# Patient Record
Sex: FEMALE | Race: WHITE | NOT HISPANIC OR LATINO | Employment: FULL TIME | URBAN - METROPOLITAN AREA
[De-identification: names, ages, dates, MRNs, and addresses within clinical notes are randomized per-mention and may not be internally consistent; named-entity substitution may affect disease eponyms.]

---

## 2019-03-06 ENCOUNTER — APPOINTMENT (OUTPATIENT)
Dept: WOMENS IMAGING | Facility: HOSPITAL | Age: 44
End: 2019-03-06

## 2019-03-06 PROCEDURE — 77067 SCR MAMMO BI INCL CAD: CPT | Performed by: RADIOLOGY

## 2019-05-13 ENCOUNTER — HOSPITAL ENCOUNTER (EMERGENCY)
Facility: HOSPITAL | Age: 44
Discharge: HOME | End: 2019-05-13
Attending: EMERGENCY MEDICINE | Admitting: EMERGENCY MEDICINE

## 2019-05-13 ENCOUNTER — APPOINTMENT (OUTPATIENT)
Dept: GENERAL RADIOLOGY | Facility: HOSPITAL | Age: 44
End: 2019-05-13

## 2019-05-13 VITALS
HEIGHT: 62 IN | HEART RATE: 76 BPM | DIASTOLIC BLOOD PRESSURE: 89 MMHG | SYSTOLIC BLOOD PRESSURE: 125 MMHG | TEMPERATURE: 98 F | WEIGHT: 178 LBS | OXYGEN SATURATION: 98 % | BODY MASS INDEX: 32.76 KG/M2 | RESPIRATION RATE: 16 BRPM

## 2019-05-13 DIAGNOSIS — R94.31 PROLONGED QT INTERVAL: ICD-10-CM

## 2019-05-13 DIAGNOSIS — R07.89 ATYPICAL CHEST PAIN: Primary | ICD-10-CM

## 2019-05-13 LAB
ALBUMIN SERPL-MCNC: 3.5 G/DL (ref 3.5–5.2)
ALBUMIN/GLOB SERPL: 1 G/DL
ALP SERPL-CCNC: 99 U/L (ref 39–117)
ALT SERPL W P-5'-P-CCNC: 19 U/L (ref 1–33)
ANION GAP SERPL CALCULATED.3IONS-SCNC: 11 MMOL/L
AST SERPL-CCNC: 13 U/L (ref 1–32)
BASOPHILS # BLD AUTO: 0.02 10*3/MM3 (ref 0–0.2)
BASOPHILS NFR BLD AUTO: 0.2 % (ref 0–1.5)
BILIRUB SERPL-MCNC: <0.2 MG/DL (ref 0.2–1.2)
BUN BLD-MCNC: 9 MG/DL (ref 6–20)
BUN/CREAT SERPL: 11 (ref 7–25)
CALCIUM SPEC-SCNC: 8.6 MG/DL (ref 8.6–10.5)
CHLORIDE SERPL-SCNC: 107 MMOL/L (ref 98–107)
CO2 SERPL-SCNC: 23 MMOL/L (ref 22–29)
CREAT BLD-MCNC: 0.82 MG/DL (ref 0.57–1)
DEPRECATED RDW RBC AUTO: 44.4 FL (ref 37–54)
EOSINOPHIL # BLD AUTO: 0.07 10*3/MM3 (ref 0–0.4)
EOSINOPHIL NFR BLD AUTO: 0.8 % (ref 0.3–6.2)
ERYTHROCYTE [DISTWIDTH] IN BLOOD BY AUTOMATED COUNT: 14.3 % (ref 12.3–15.4)
GFR SERPL CREATININE-BSD FRML MDRD: 76 ML/MIN/1.73
GLOBULIN UR ELPH-MCNC: 3.6 GM/DL
GLUCOSE BLD-MCNC: 118 MG/DL (ref 65–99)
HCT VFR BLD AUTO: 39.9 % (ref 34–46.6)
HGB BLD-MCNC: 12.9 G/DL (ref 12–15.9)
HOLD SPECIMEN: NORMAL
HOLD SPECIMEN: NORMAL
IMM GRANULOCYTES # BLD AUTO: 0.01 10*3/MM3 (ref 0–0.05)
IMM GRANULOCYTES NFR BLD AUTO: 0.1 % (ref 0–0.5)
LIPASE SERPL-CCNC: 21 U/L (ref 13–60)
LYMPHOCYTES # BLD AUTO: 1.88 10*3/MM3 (ref 0.7–3.1)
LYMPHOCYTES NFR BLD AUTO: 21.8 % (ref 19.6–45.3)
MCH RBC QN AUTO: 27.6 PG (ref 26.6–33)
MCHC RBC AUTO-ENTMCNC: 32.3 G/DL (ref 31.5–35.7)
MCV RBC AUTO: 85.3 FL (ref 79–97)
MONOCYTES # BLD AUTO: 0.55 10*3/MM3 (ref 0.1–0.9)
MONOCYTES NFR BLD AUTO: 6.4 % (ref 5–12)
NEUTROPHILS # BLD AUTO: 6.11 10*3/MM3 (ref 1.7–7)
NEUTROPHILS NFR BLD AUTO: 70.8 % (ref 42.7–76)
NT-PROBNP SERPL-MCNC: 88.8 PG/ML (ref 5–450)
PLATELET # BLD AUTO: 332 10*3/MM3 (ref 140–450)
PMV BLD AUTO: 10.1 FL (ref 6–12)
POTASSIUM BLD-SCNC: 4.1 MMOL/L (ref 3.5–5.2)
PROT SERPL-MCNC: 7.1 G/DL (ref 6–8.5)
RBC # BLD AUTO: 4.68 10*6/MM3 (ref 3.77–5.28)
SODIUM BLD-SCNC: 141 MMOL/L (ref 136–145)
TROPONIN T SERPL-MCNC: <0.01 NG/ML (ref 0–0.03)
TROPONIN T SERPL-MCNC: <0.01 NG/ML (ref 0–0.03)
WBC NRBC COR # BLD: 8.63 10*3/MM3 (ref 3.4–10.8)
WHOLE BLOOD HOLD SPECIMEN: NORMAL
WHOLE BLOOD HOLD SPECIMEN: NORMAL

## 2019-05-13 PROCEDURE — 93005 ELECTROCARDIOGRAM TRACING: CPT | Performed by: EMERGENCY MEDICINE

## 2019-05-13 PROCEDURE — 85025 COMPLETE CBC W/AUTO DIFF WBC: CPT | Performed by: EMERGENCY MEDICINE

## 2019-05-13 PROCEDURE — 83880 ASSAY OF NATRIURETIC PEPTIDE: CPT | Performed by: EMERGENCY MEDICINE

## 2019-05-13 PROCEDURE — 83690 ASSAY OF LIPASE: CPT | Performed by: EMERGENCY MEDICINE

## 2019-05-13 PROCEDURE — 99284 EMERGENCY DEPT VISIT MOD MDM: CPT

## 2019-05-13 PROCEDURE — 84484 ASSAY OF TROPONIN QUANT: CPT | Performed by: EMERGENCY MEDICINE

## 2019-05-13 PROCEDURE — 71045 X-RAY EXAM CHEST 1 VIEW: CPT

## 2019-05-13 PROCEDURE — 80053 COMPREHEN METABOLIC PANEL: CPT | Performed by: EMERGENCY MEDICINE

## 2019-05-13 RX ORDER — SODIUM CHLORIDE 0.9 % (FLUSH) 0.9 %
10 SYRINGE (ML) INJECTION AS NEEDED
Status: DISCONTINUED | OUTPATIENT
Start: 2019-05-13 | End: 2019-05-13

## 2019-05-13 RX ORDER — ASPIRIN 81 MG/1
324 TABLET, CHEWABLE ORAL ONCE
Status: DISCONTINUED | OUTPATIENT
Start: 2019-05-13 | End: 2019-05-13

## 2019-05-13 RX ORDER — NAPROXEN SODIUM 220 MG
220 TABLET ORAL 2 TIMES DAILY PRN
COMMUNITY

## 2019-05-13 RX ORDER — SODIUM CHLORIDE 0.9 % (FLUSH) 0.9 %
10 SYRINGE (ML) INJECTION AS NEEDED
Status: DISCONTINUED | OUTPATIENT
Start: 2019-05-13 | End: 2019-05-13 | Stop reason: HOSPADM

## 2019-05-13 RX ORDER — LISINOPRIL 10 MG/1
10 TABLET ORAL DAILY
COMMUNITY

## 2019-05-13 RX ORDER — OMEPRAZOLE 20 MG/1
20 CAPSULE, DELAYED RELEASE ORAL 2 TIMES DAILY
COMMUNITY
End: 2019-05-16

## 2019-05-13 NOTE — ED PROVIDER NOTES
"Subjective   This patient is a 43-year-old  female with a history of chest pain that started yesterday.  She tells me she had a tightness in the middle of her chest as well as bilateral arm and hand \"tingling.\"  This discomfort lasted most of yesterday and through the night and so she went to [facility] in [facility] yesterday.  She tells me her blood pressure was high there and her troponin was abnormal.  She was ultimately admitted for chest pain.  She tells me she was told that multiple studies were going to be performed today due to her presentation, abnormal labs, and findings.  The patient then tells me somewhat complex story related to her frustration around nursing care at this outside hospital.  She tells me she had a headache that was not properly treated and that her pain was not adequately controlled.  She decided to check herself out AGAINST MEDICAL ADVICE from [facility] and had her son bring her up here for second opinion and another shot and evaluate her chest pain.  She has not been here before.  These are new complaints for her.  She denies hemoptysis or hematemesis.  Denies any lower extremity swelling or pain.  She does give me a history of significant family cardiac disease.  She denies any current shortness of breath.  Tells me she does not currently have chest pain.  Her blood pressure is 138/84 when I evaluate her in the room.  In summary, we have a 43-year-old female here with chest pain who was recently evaluated and admitted to a local hospital but did not complete care and he signed out AMA, presenting here this morning for evaluation of the same.    Past medical history  Hypertension, on medication.  Hypercholesterolemia, not medicated at this time    Family history  Significant for CAD            Review of Systems   Constitutional: Negative.  Negative for chills, fatigue, fever and unexpected weight change.   HENT: Negative for dental problem, ear pain, " "hearing loss and sinus pressure.    Eyes: Negative for pain and visual disturbance.   Respiratory: Negative for chest tightness and shortness of breath.    Cardiovascular: Positive for chest pain. Negative for palpitations and leg swelling.        Tightness.   Gastrointestinal: Negative for blood in stool, diarrhea, nausea and vomiting.   Genitourinary: Negative for difficulty urinating, dysuria, frequency, hematuria and urgency.   Musculoskeletal: Negative for myalgias, neck pain and neck stiffness.   Neurological: Negative for seizures, syncope, speech difficulty, light-headedness and headaches.        Numbness and tingling in the bilateral hands   Psychiatric/Behavioral: Negative for confusion.   All other systems reviewed and are negative.      Past Medical History:   Diagnosis Date   • Dercum's disease    • Fibromyalgia    • GERD (gastroesophageal reflux disease)    • Hyperlipidemia    • Hypertension        Allergies   Allergen Reactions   • Morphine Other (See Comments)     Pt states her blood pressure went \"through the roof, stroke level\"   • Sulfa Antibiotics GI Intolerance     \"makes my stomach hurt\"       Past Surgical History:   Procedure Laterality Date   • CHOLECYSTECTOMY     • DENTAL PROCEDURE     • DILATATION AND CURETTAGE     • HYSTERECTOMY     • OOPHORECTOMY Right    • OTHER SURGICAL HISTORY      has had 19 dercum angiomas removed       History reviewed. No pertinent family history.    Social History     Socioeconomic History   • Marital status:      Spouse name: Not on file   • Number of children: Not on file   • Years of education: Not on file   • Highest education level: Not on file   Tobacco Use   • Smoking status: Current Every Day Smoker     Packs/day: 1.00     Types: Cigarettes   • Smokeless tobacco: Never Used   Substance and Sexual Activity   • Alcohol use: No     Frequency: Never   • Drug use: No   • Sexual activity: Defer           Objective   Physical Exam   Constitutional: She " is oriented to person, place, and time. She appears well-developed.  Non-toxic appearance. No distress.   HENT:   Head: Normocephalic and atraumatic.   Right Ear: Tympanic membrane, external ear and ear canal normal.   Left Ear: Tympanic membrane, external ear and ear canal normal.   Nose: Nose normal. No nasal septal hematoma.   Mouth/Throat: Oropharynx is clear and moist. Mucous membranes are not dry. No oral lesions. No trismus in the jaw. No dental abscesses or uvula swelling. No posterior oropharyngeal erythema or tonsillar abscesses. No tonsillar exudate.   Eyes: EOM are normal. Pupils are equal, round, and reactive to light. Right conjunctiva is not injected. Left conjunctiva is not injected.   Neck: Normal range of motion. Neck supple. No JVD present. No tracheal tenderness present. No neck rigidity. Normal range of motion present.   Cardiovascular: Normal rate, regular rhythm and normal heart sounds. Exam reveals no gallop and no friction rub.   Pulmonary/Chest: Effort normal and breath sounds normal. She has no wheezes. She has no rales. She exhibits no tenderness.   Abdominal: Soft. Bowel sounds are normal. She exhibits no distension, no pulsatile midline mass and no mass. There is no tenderness. There is no rigidity, no rebound, no guarding and no tenderness at McBurney's point.   No signs of acute abdomen.  No pain at McBurney's point.  No pulsatile abdominal mass.   Musculoskeletal: Normal range of motion. She exhibits no edema, tenderness or deformity.   Lymphadenopathy:     She has no cervical adenopathy.   Neurological: She is alert and oriented to person, place, and time. She has normal strength. She displays no tremor. No cranial nerve deficit or sensory deficit. She exhibits normal muscle tone.   5/5 strength bilaterally with flexion and extension of fingers, wrist, elbows, knees and hips as well as plantar and dorsiflexion of the foot.   Skin: Skin is warm and dry. No rash noted. No erythema.    No diaphoresis, lesions, nevi, petechia, purpura   Psychiatric: She has a normal mood and affect. Her speech is normal and behavior is normal. Judgment and thought content normal. She is attentive.   Nursing note and vitals reviewed.      Procedures           ED Course  ED Course as of May 13 1607   Mon May 13, 2019   0655 Patient evidently signed out AGAINST MEDICAL ADVICE from a local hospital this morning.  Initial blood pressure here was 149/111.  Repeat is 138/84.  [KEYA]   0657 Pt. Received aspirin in [place], [place] at outside hospital.  324 mg.   [KEYA]   0705 I had a nice conversation with the patient.  I discussed the work-up, the differential diagnosis, and the medical decision making.  Initial EKG shows somewhat prolonged QT segment of 492.  Patient is currently chest pain-free.  Patient is without question or complaint.  I described the likely plan here and she is agreeable to it.  No questions or complaints at this time.  We will keep a close eye on the patient.  If lab and diagnostic work-up is unremarkable, as anticipated, I do anticipate patient will follow-up with cardiology as an outpatient.  Patient is agreeable to the plan and without question or complaint.  [KEYA]   0846 CBC, CMP, troponin, lipase, BNP, chest x-ray and EKG all essentially unremarkable with exception of the QTC previously noted, and discussed with the patient.  Second set is pending.  If unremarkable, as anticipated, I do to be discharged home to follow-up with cardiology as an outpatient.  [KEYA]   0904 Second EKG was just completed.  I reviewed independently.  A normal sinus rhythm with a rate of 63 otherwise no abnormalities was noted.  QTC on this EKG is 474 which is improved.  [KEYA]   0917 Dr. Escobedo reevaluated the patient at bedside and updated them on findings and discussed plan for further care.   [TJ]   0940 I have now reexamined the patient twice for a total of 3 evaluations.  First and second troponin unremarkable.  Second  EKG showed a normal sinus rhythm with no evidence of abnormality.  We will have the patient follow-up with the chest pain clinic.  I do believe outpatient and additional testing is appropriate.  Patient is very agreeable to this plan.  We will have her take aspirin as needed.  Impression will include atypical chest pain, prolonged QT on initial EKG.  Plan will include mandatory follow-up with our chest pain clinic.  They will call for an appointment.  The patient should call here at [phone] by 12:00 tomorrow if she has not heard from them around scheduling.  Aspirin daily.  Return immediately for new or changing concerns.  Patient agreeable to the plan and without question or complaint will be discharged home accordingly.  [KEYA]   3014 Dr. Escobedo reevaluated the patient at bedside and discussed all available findings and plan for further care.   [TJ]      ED Course User Index  [KEYA] Allan Escobedo MD  [TJ] Patrice Darby                  [facility]      Final diagnoses:   Atypical chest pain   Prolonged QT interval            Allan Escobedo MD  05/13/19 1658

## 2019-05-13 NOTE — DISCHARGE INSTRUCTIONS
Follow up with our Chest Pain clinic as I have arranged for you. They will call you to schedule an appointment. If you have not heard by them tomorrow by noon, please call them to arrange an appointment. Start taking Aspirin daily as we discussed at bedside at the time of your discharge. Follow up with your PCP, Elza Ventura, within one week.     Immediately return to the ED for worsening or new concerning symptoms.     Please review the medications you are supposed to be taking according to prior physician recommendations. I have not changed your home medications during this visit. If your discharge instructions indicate that I have changed your home medications, this is not the case, and you should disregard. If you have any questions about the medication you should be taking at home, please call your physician.

## 2019-05-15 NOTE — PROGRESS NOTES
"[facility]  [facility]      Encounter Date:05/16/2019     Larisa Cisneros  [address] [place] [id number]  [unfilled]    1975    Elza Ventura    Larisa Cisneros is a 43 y.o. female.      Subjective:     Chief Complaint:   ED referral for chest pain    HPI   Patient is a 43-year-old female with past medical history significant for hyperlipidemia, hypertension, GERD, fibromyalgia who presents to the chest pain clinic at the request of Dr. Escobedo.  Patient presented the ED on 5/13/2019 with tightness in the middle of her chest as well as bilateral arm and hand tingling.  Pain came on suddenly. Patient went to [facility] 5/12/19 and reports troponins were initially elevated and she was admitted for cardiac testing.  Patient checked herself out AGAINST MEDICAL ADVICE due to frustration with medical care.  She presented to [facility] for a second opinion.  Work-up in ED was unremarkable except for slightly prolonged QT C  on initial EKG, which improved to on repeat EKG. She notes several months of worsening fatigue and exertional dyspnea. She also notes increasing palpitations over past several months described as a \"flip flop\"    Cardiac risk factors:  dyslipidemia, hypertension, tobacco use, sedentary lifestyle, obesity (BMI > 30),  Family history of premature coronary artery disease (male < 55 yrs, female <66 yrs)      Past Medical History:   Diagnosis Date   • Dercum's disease    • Fibromyalgia    • GERD (gastroesophageal reflux disease)    • Hyperlipidemia    • Hypertension        Past Surgical History:   Procedure Laterality Date   • CHOLECYSTECTOMY     • DENTAL PROCEDURE     • DILATATION AND CURETTAGE     • HYSTERECTOMY     • OOPHORECTOMY Right    • OTHER SURGICAL HISTORY      has had 19 dercum angiomas removed       Family History   Problem Relation Age of Onset   • Hypothyroidism Mother    • Hypertension Mother    • Diabetes Mother  " "  • Heart attack Father    • Asthma Father    • Diabetes Sister    • Kidney disease Sister    • Heart failure Sister    • Polycystic kidney disease Sister    • Hypertension Sister    • Other Sister         angiomas   • Other Maternal Grandmother         factor 5    • Heart attack Maternal Grandfather    • Cancer Paternal Grandmother    • Other Paternal Grandmother         ra   • Heart attack Paternal Grandfather        Social History     Socioeconomic History   • Marital status:      Spouse name: Not on file   • Number of children: Not on file   • Years of education: Not on file   • Highest education level: Not on file   Tobacco Use   • Smoking status: Current Every Day Smoker     Packs/day: 1.00     Types: Cigarettes   • Smokeless tobacco: Never Used   Substance and Sexual Activity   • Alcohol use: No     Frequency: Never   • Drug use: No   • Sexual activity: Defer   Social History Narrative    Caffeine: 1 serving daily and trying to quit        Allergies   Allergen Reactions   • Morphine Other (See Comments)     Pt states her blood pressure went \"through the roof, stroke level\"   • Sulfa Antibiotics GI Intolerance     \"makes my stomach hurt\"         Current Outpatient Medications:   •  lisinopril (PRINIVIL,ZESTRIL) 10 MG tablet, Take 10 mg by mouth Daily., Disp: , Rfl:   •  naproxen sodium (ALEVE) 220 MG tablet, Take 220 mg by mouth 2 (Two) Times a Day As Needed., Disp: , Rfl:   •  raNITIdine (ZANTAC) 150 MG tablet, Take 150 mg by mouth Daily., Disp: , Rfl: 1    The following portions of the patient's history were reviewed today and updated as appropriate: allergies, current medications, past family history, past medical history, past social history, past surgical history and problem list     Review of Systems   Constitution: Positive for weakness, malaise/fatigue and weight gain. Negative for chills and fever.   HENT: Positive for congestion and nosebleeds.    Eyes: Negative.    Cardiovascular: Positive " "for chest pain, dyspnea on exertion and leg swelling. Negative for claudication, cyanosis, irregular heartbeat, near-syncope, orthopnea, palpitations, paroxysmal nocturnal dyspnea and syncope.   Respiratory: Positive for cough, shortness of breath, snoring and wheezing.    Endocrine: Positive for cold intolerance, heat intolerance and polydipsia.   Hematologic/Lymphatic: Does not bruise/bleed easily.   Skin: Negative for poor wound healing.   Musculoskeletal: Negative.    Gastrointestinal: Positive for bloating, abdominal pain, constipation, diarrhea, dysphagia, heartburn and nausea. Negative for hematemesis, melena and vomiting.   Genitourinary: Negative.  Negative for hematuria.   Neurological: Positive for dizziness, light-headedness and loss of balance.   Psychiatric/Behavioral: Positive for memory loss. The patient has insomnia and is nervous/anxious.    Allergic/Immunologic: Positive for environmental allergies.       Objective:     Vitals:    05/16/19 1017 05/16/19 1018 05/16/19 1019   BP: 158/92 151/96 146/91   BP Location: Right arm Left arm Left arm   Patient Position: Sitting Sitting Standing   Cuff Size: Adult Adult Adult   Pulse: 81 88 90   Resp: 18     Temp: 97.1 °F (36.2 °C)     TempSrc: Temporal     SpO2: 97% 97% 98%   Weight: 82.7 kg (182 lb 6 oz)     Height: 157.5 cm (62\")         Physical Exam   Constitutional: She is oriented to person, place, and time. She appears well-developed and well-nourished. No distress.   HENT:   Head: Normocephalic.   Eyes: Conjunctivae are normal. Pupils are equal, round, and reactive to light.   Neck: Neck supple. No JVD present. No thyromegaly present.   Cardiovascular: Normal rate, regular rhythm, normal heart sounds and intact distal pulses. Exam reveals no gallop and no friction rub.   No murmur heard.  Pulmonary/Chest: Effort normal. No respiratory distress. She has no wheezes. She has rhonchi in the left lower field. She has no rales. She exhibits no tenderness. "   Abdominal: Soft. Bowel sounds are normal.   Musculoskeletal: Normal range of motion. She exhibits no edema.   Neurological: She is alert and oriented to person, place, and time.   Skin: Skin is warm and dry.   Psychiatric: She has a normal mood and affect. Her behavior is normal. Thought content normal.   Vitals reviewed.      Lab and Diagnostic Review:  Results for orders placed or performed during the hospital encounter of 05/13/19   Troponin   Result Value Ref Range    Troponin T <0.010 0.000 - 0.030 ng/mL   Comprehensive Metabolic Panel   Result Value Ref Range    Glucose 118 (H) 65 - 99 mg/dL    BUN 9 6 - 20 mg/dL    Creatinine 0.82 0.57 - 1.00 mg/dL    Sodium 141 136 - 145 mmol/L    Potassium 4.1 3.5 - 5.2 mmol/L    Chloride 107 98 - 107 mmol/L    CO2 23.0 22.0 - 29.0 mmol/L    Calcium 8.6 8.6 - 10.5 mg/dL    Total Protein 7.1 6.0 - 8.5 g/dL    Albumin 3.50 3.50 - 5.20 g/dL    ALT (SGPT) 19 1 - 33 U/L    AST (SGOT) 13 1 - 32 U/L    Alkaline Phosphatase 99 39 - 117 U/L    Total Bilirubin <0.2 (L) 0.2 - 1.2 mg/dL    eGFR Non African Amer 76 >60 mL/min/1.73    Globulin 3.6 gm/dL    A/G Ratio 1.0 g/dL    BUN/Creatinine Ratio 11.0 7.0 - 25.0    Anion Gap 11.0 mmol/L   Lipase   Result Value Ref Range    Lipase 21 13 - 60 U/L   BNP   Result Value Ref Range    proBNP 88.8 5.0 - 450.0 pg/mL   CBC Auto Differential   Result Value Ref Range    WBC 8.63 3.40 - 10.80 10*3/mm3    RBC 4.68 3.77 - 5.28 10*6/mm3    Hemoglobin 12.9 12.0 - 15.9 g/dL    Hematocrit 39.9 34.0 - 46.6 %    MCV 85.3 79.0 - 97.0 fL    MCH 27.6 26.6 - 33.0 pg    MCHC 32.3 31.5 - 35.7 g/dL    RDW 14.3 12.3 - 15.4 %    RDW-SD 44.4 37.0 - 54.0 fl    MPV 10.1 6.0 - 12.0 fL    Platelets 332 140 - 450 10*3/mm3    Neutrophil % 70.8 42.7 - 76.0 %    Lymphocyte % 21.8 19.6 - 45.3 %    Monocyte % 6.4 5.0 - 12.0 %    Eosinophil % 0.8 0.3 - 6.2 %    Basophil % 0.2 0.0 - 1.5 %    Immature Grans % 0.1 0.0 - 0.5 %    Neutrophils, Absolute 6.11 1.70 - 7.00 10*3/mm3     Lymphocytes, Absolute 1.88 0.70 - 3.10 10*3/mm3    Monocytes, Absolute 0.55 0.10 - 0.90 10*3/mm3    Eosinophils, Absolute 0.07 0.00 - 0.40 10*3/mm3    Basophils, Absolute 0.02 0.00 - 0.20 10*3/mm3    Immature Grans, Absolute 0.01 0.00 - 0.05 10*3/mm3   Troponin   Result Value Ref Range    Troponin T <0.010 0.000 - 0.030 ng/mL     EKG in ED showed NSR    Assessment and Plan:   1. Other chest pain  Get records from [facility]  Multiple ASCVD risks  - Adult Stress Echo W/ Cont or Stress Agent if Necessary Per Protocol; Future    2. Shortness of breath  - Adult Stress Echo W/ Cont or Stress Agent if Necessary Per Protocol; Future    3. Palpitations  - Adult Stress Echo W/ Cont or Stress Agent if Necessary Per Protocol; Future  - Holter Monitor x 14 days    4. Essential hypertension  Elevated today but did not take meds. Patient states she is picking up a blood pressure cuff today and will monitor. Goal <130/80  - Adult Stress Echo W/ Cont or Stress Agent if Necessary Per Protocol; Future    5. Hyperlipidemia, unspecified hyperlipidemia type  Reports that it was found on recent labs. She would like to try diet for a few months and repeat  - Adult Stress Echo W/ Cont or Stress Agent if Necessary Per Protocol; Future    6. Tobacco abuse  Cessation encouraged. Discussed risks    RV in 4 weeks      It has been a pleasure to participate in the care of this patient.  Patient was instructed to call the [facility] with any questions, concerns, or worsening symptoms.    *Please note that portions of this note were completed with a voice recognition program. Efforts were made to edit the dictations, but occasionally words are mistranscribed.

## 2019-05-16 ENCOUNTER — TELEPHONE (OUTPATIENT)
Dept: CARDIOLOGY | Facility: HOSPITAL | Age: 44
End: 2019-05-16

## 2019-05-16 ENCOUNTER — HOSPITAL ENCOUNTER (OUTPATIENT)
Dept: CARDIOLOGY | Facility: HOSPITAL | Age: 44
Discharge: HOME | End: 2019-05-16
Admitting: NURSE PRACTITIONER

## 2019-05-16 ENCOUNTER — OFFICE VISIT (OUTPATIENT)
Dept: CARDIOLOGY | Facility: HOSPITAL | Age: 44
End: 2019-05-16

## 2019-05-16 VITALS
DIASTOLIC BLOOD PRESSURE: 91 MMHG | OXYGEN SATURATION: 98 % | BODY MASS INDEX: 33.56 KG/M2 | TEMPERATURE: 97.1 F | WEIGHT: 182.38 LBS | RESPIRATION RATE: 18 BRPM | HEIGHT: 62 IN | HEART RATE: 90 BPM | SYSTOLIC BLOOD PRESSURE: 146 MMHG

## 2019-05-16 DIAGNOSIS — R07.89 OTHER CHEST PAIN: Primary | ICD-10-CM

## 2019-05-16 DIAGNOSIS — I10 ESSENTIAL HYPERTENSION: ICD-10-CM

## 2019-05-16 DIAGNOSIS — E78.5 HYPERLIPIDEMIA, UNSPECIFIED HYPERLIPIDEMIA TYPE: ICD-10-CM

## 2019-05-16 DIAGNOSIS — R06.02 SHORTNESS OF BREATH: ICD-10-CM

## 2019-05-16 DIAGNOSIS — R00.2 PALPITATIONS: ICD-10-CM

## 2019-05-16 PROCEDURE — 0296T HC EXT ECG > 48HR TO 21 DAY RCRD W/CONECT INTL RCRD: CPT

## 2019-05-16 PROCEDURE — 99204 OFFICE O/P NEW MOD 45 MIN: CPT | Performed by: NURSE PRACTITIONER

## 2019-05-16 PROCEDURE — 0298T HOLTER MONITOR - 72 HOUR UP TO 21 DAY: CPT | Performed by: INTERNAL MEDICINE

## 2019-05-16 RX ORDER — RANITIDINE 150 MG/1
150 TABLET ORAL DAILY
Refills: 1 | COMMUNITY
Start: 2019-04-15

## 2019-05-16 RX ORDER — ATORVASTATIN CALCIUM 40 MG/1
40 TABLET, FILM COATED ORAL DAILY
Qty: 30 TABLET | Refills: 3 | Status: SHIPPED | OUTPATIENT
Start: 2019-05-16

## 2019-05-16 NOTE — TELEPHONE ENCOUNTER
Received records from [facility] which showed that patient had a troponin I of 0.085 on 5/12/2019 at 2250 and 0.092 on 5/13/2019 at 3:46 AM. She was started on heparin but left AMA because she did not get dilaudid for her headache in a timely fashion per records.  Called patient and recommended that she start aspirin, statin and beta-blocker.  Explained rationale for starting these medications and she agrees to proceed.  She will have stress test on Monday.

## 2019-05-20 ENCOUNTER — HOSPITAL ENCOUNTER (OUTPATIENT)
Dept: CARDIOLOGY | Facility: HOSPITAL | Age: 44
Discharge: HOME | End: 2019-05-20
Admitting: NURSE PRACTITIONER

## 2019-05-20 DIAGNOSIS — E78.5 HYPERLIPIDEMIA, UNSPECIFIED HYPERLIPIDEMIA TYPE: ICD-10-CM

## 2019-05-20 DIAGNOSIS — R06.02 SHORTNESS OF BREATH: ICD-10-CM

## 2019-05-20 DIAGNOSIS — R07.89 OTHER CHEST PAIN: ICD-10-CM

## 2019-05-20 DIAGNOSIS — R00.2 PALPITATIONS: ICD-10-CM

## 2019-05-20 DIAGNOSIS — I10 ESSENTIAL HYPERTENSION: ICD-10-CM

## 2019-05-20 LAB
BH CV ECHO MEAS - AO ROOT AREA (BSA CORRECTED): 1.6
BH CV ECHO MEAS - AO ROOT AREA: 6.8 CM^2
BH CV ECHO MEAS - AO ROOT DIAM: 2.9 CM
BH CV ECHO MEAS - BSA(HAYCOCK): 1.9 M^2
BH CV ECHO MEAS - BSA: 1.8 M^2
BH CV ECHO MEAS - BZI_BMI: 33.3 KILOGRAMS/M^2
BH CV ECHO MEAS - BZI_METRIC_HEIGHT: 157.5 CM
BH CV ECHO MEAS - BZI_METRIC_WEIGHT: 82.6 KG
BH CV ECHO MEAS - EDV(CUBED): 80 ML
BH CV ECHO MEAS - EDV(TEICH): 83.5 ML
BH CV ECHO MEAS - EF(CUBED): 82 %
BH CV ECHO MEAS - EF(TEICH): 75 %
BH CV ECHO MEAS - ESV(CUBED): 14.4 ML
BH CV ECHO MEAS - ESV(TEICH): 20.8 ML
BH CV ECHO MEAS - FS: 43.6 %
BH CV ECHO MEAS - IVS/LVPW: 1
BH CV ECHO MEAS - IVSD: 1.2 CM
BH CV ECHO MEAS - LA DIMENSION: 3.6 CM
BH CV ECHO MEAS - LA/AO: 1.2
BH CV ECHO MEAS - LV MASS(C)D: 176.7 GRAMS
BH CV ECHO MEAS - LV MASS(C)DI: 96.2 GRAMS/M^2
BH CV ECHO MEAS - LVIDD: 4.3 CM
BH CV ECHO MEAS - LVIDS: 2.4 CM
BH CV ECHO MEAS - LVPWD: 1.2 CM
BH CV ECHO MEAS - MV A MAX VEL: 68.6 CM/SEC
BH CV ECHO MEAS - MV DEC SLOPE: 399.6 CM/SEC^2
BH CV ECHO MEAS - MV DEC TIME: 0.25 SEC
BH CV ECHO MEAS - MV E MAX VEL: 58.2 CM/SEC
BH CV ECHO MEAS - MV E/A: 0.85
BH CV ECHO MEAS - MV P1/2T MAX VEL: 86.9 CM/SEC
BH CV ECHO MEAS - MV P1/2T: 63.7 MSEC
BH CV ECHO MEAS - MVA P1/2T LCG: 2.5 CM^2
BH CV ECHO MEAS - MVA(P1/2T): 3.5 CM^2
BH CV ECHO MEAS - PA ACC SLOPE: 293.7 CM/SEC^2
BH CV ECHO MEAS - PA ACC TIME: 0.15 SEC
BH CV ECHO MEAS - PA PR(ACCEL): 12.5 MMHG
BH CV ECHO MEAS - SI(CUBED): 35.8 ML/M^2
BH CV ECHO MEAS - SI(TEICH): 34.1 ML/M^2
BH CV ECHO MEAS - SV(CUBED): 65.7 ML
BH CV ECHO MEAS - SV(TEICH): 62.7 ML
BH CV STRESS BP STAGE 1: NORMAL
BH CV STRESS BP STAGE 2: NORMAL
BH CV STRESS BP STAGE 3: NORMAL
BH CV STRESS DURATION MIN STAGE 1: 3
BH CV STRESS DURATION MIN STAGE 2: 3
BH CV STRESS DURATION MIN STAGE 3: 2
BH CV STRESS DURATION SEC STAGE 1: 0
BH CV STRESS DURATION SEC STAGE 2: 0
BH CV STRESS DURATION SEC STAGE 3: 54
BH CV STRESS ECHO POST STRESS EJECTION FRACTION EF: 75 %
BH CV STRESS GRADE STAGE 1: 10
BH CV STRESS GRADE STAGE 2: 12
BH CV STRESS GRADE STAGE 3: 14
BH CV STRESS HR STAGE 1: 112
BH CV STRESS HR STAGE 2: 127
BH CV STRESS HR STAGE 3: 151
BH CV STRESS METS STAGE 1: 5
BH CV STRESS METS STAGE 2: 7.5
BH CV STRESS METS STAGE 3: 10
BH CV STRESS PROTOCOL 1: NORMAL
BH CV STRESS RECOVERY BP: NORMAL MMHG
BH CV STRESS RECOVERY HR: 88 BPM
BH CV STRESS SPEED STAGE 1: 1.7
BH CV STRESS SPEED STAGE 2: 2.5
BH CV STRESS SPEED STAGE 3: 3.4
BH CV STRESS STAGE 1: 1
BH CV STRESS STAGE 2: 2
BH CV STRESS STAGE 3: 3
BH CV XLRA - RV BASE: 2.5 CM
BH CV XLRA - RV LENGTH: 6.5 CM
BH CV XLRA - RV MID: 2.3 CM
LV EF 2D ECHO EST: 65 %
PERCENT MAX PREDICTED HR: 85.8 %
STRESS BASELINE BP: NORMAL MMHG
STRESS BASELINE HR: 87 BPM
STRESS PERCENT HR: 101 %
STRESS POST ESTIMATED WORKLOAD: 10.1 METS
STRESS POST EXERCISE DUR MIN: 8 MIN
STRESS POST EXERCISE DUR SEC: 54 SEC
STRESS POST PEAK BP: NORMAL MMHG
STRESS POST PEAK HR: 151 BPM

## 2019-05-20 PROCEDURE — 93350 STRESS TTE ONLY: CPT

## 2019-05-20 PROCEDURE — 93350 STRESS TTE ONLY: CPT | Performed by: INTERNAL MEDICINE

## 2019-05-20 PROCEDURE — 93320 DOPPLER ECHO COMPLETE: CPT

## 2019-05-20 PROCEDURE — 93320 DOPPLER ECHO COMPLETE: CPT | Performed by: INTERNAL MEDICINE

## 2019-05-20 PROCEDURE — 93325 DOPPLER ECHO COLOR FLOW MAPG: CPT | Performed by: INTERNAL MEDICINE

## 2019-05-20 PROCEDURE — 93018 CV STRESS TEST I&R ONLY: CPT | Performed by: INTERNAL MEDICINE

## 2019-05-20 PROCEDURE — 93352 ADMIN ECG CONTRAST AGENT: CPT | Performed by: INTERNAL MEDICINE

## 2019-05-20 PROCEDURE — 93017 CV STRESS TEST TRACING ONLY: CPT

## 2019-05-20 PROCEDURE — 93325 DOPPLER ECHO COLOR FLOW MAPG: CPT

## 2019-05-20 PROCEDURE — 25010000002 SULFUR HEXAFLUORIDE MICROSPH 60.7-25 MG RECONSTITUTED SUSPENSION: Performed by: NURSE PRACTITIONER

## 2019-05-20 RX ADMIN — SULFUR HEXAFLUORIDE 5 ML: KIT at 15:39

## 2019-05-21 ENCOUNTER — TELEPHONE (OUTPATIENT)
Dept: CARDIOLOGY | Facility: HOSPITAL | Age: 44
End: 2019-05-21

## 2019-05-21 NOTE — TELEPHONE ENCOUNTER
· Excellent exercise capacity with normal hemodynamic response to exercise.  · Negative treadmill stress test for anginal chest pain or diagnostic ST segment changes at high workload and target heart rate.  · THR of 149 achieved at 8:40, max hr 151, 85% of MPHR. Expected exercise time 8:20, actual time 8:54. LORI -2.  · Resting echocardiogram showed normal LV systolic function, estimated EF 65%, no significant valve disease.  · Normal stress echo with no significant echocardiographic evidence for myocardial ischemia. Post stress EF 75%.    Called patient with results of stress test. Patient notes that she is having itching under her holter monitor. Advised to take benadryl PRN but if skin irritation worsens, she can remove it

## 2019-06-12 NOTE — PROGRESS NOTES
[facility]  [facility]      Encounter Date:05/16/2019     Larisa Cisneros  [address] [facility] [address] [place] [id number]  [unfilled]    1975    Elza Ventura    Larisa Cisneros is a 43 y.o. female.      Subjective:     Chief Complaint:  Follow up chest pain and palpitations    HPI   Patient is a 43-year-old female with past medical history significant for hyperlipidemia, hypertension, GERD, fibromyalgia who presents today for follow up on chest pain and palpitations.  Patient went to [facility] 5/12/19 and reports troponins were initially elevated and she was admitted for cardiac testing.  Patient checked herself out AGAINST MEDICAL ADVICE due to frustration with medical care.  She presented to [facility] ED for a second opinion and was sent to our office for further evaluation of CP and palpitations.She had a normal stress echo. Zio monitor showed no arrhythmias. She reports that she was only able to wear 7 days because of skin irritation. She has had no further episodes of chest pain. Still has episodes of generalized weakness and fatigue. But denies dizziness. Had all of these symptoms while wearing the monitor. No exertional symptoms      Cardiac risk factors:  dyslipidemia, hypertension, tobacco use, sedentary lifestyle, obesity (BMI > 30),  Family history of premature coronary artery disease (male < 55 yrs, female <66 yrs)      Past Medical History:   Diagnosis Date   • Dercum's disease    • Fibromyalgia    • GERD (gastroesophageal reflux disease)    • Hyperlipidemia    • Hypertension        Past Surgical History:   Procedure Laterality Date   • CHOLECYSTECTOMY     • DENTAL PROCEDURE     • DILATATION AND CURETTAGE     • HYSTERECTOMY     • OOPHORECTOMY Right    • OTHER SURGICAL HISTORY      has had 19 dercum angiomas removed       Family History   Problem Relation Age of Onset   • Hypothyroidism Mother    • Hypertension Mother    • Diabetes  "Mother    • Heart attack Father    • Asthma Father    • Diabetes Sister    • Kidney disease Sister    • Heart failure Sister    • Polycystic kidney disease Sister    • Hypertension Sister    • Other Sister         angiomas   • Other Maternal Grandmother         factor 5    • Heart attack Maternal Grandfather    • Cancer Paternal Grandmother    • Other Paternal Grandmother         ra   • Heart attack Paternal Grandfather        Social History     Socioeconomic History   • Marital status:      Spouse name: Not on file   • Number of children: Not on file   • Years of education: Not on file   • Highest education level: Not on file   Tobacco Use   • Smoking status: Current Every Day Smoker     Packs/day: 1.00     Types: Cigarettes   • Smokeless tobacco: Never Used   Substance and Sexual Activity   • Alcohol use: No     Frequency: Never   • Drug use: No   • Sexual activity: Defer   Social History Narrative    Caffeine: 1 serving daily and trying to quit        Allergies   Allergen Reactions   • Morphine Other (See Comments)     Pt states her blood pressure went \"through the roof, stroke level\"   • Sulfa Antibiotics GI Intolerance     \"makes my stomach hurt\"         Current Outpatient Medications:   •  aspirin 81 MG tablet, Take 1 tablet by mouth Daily., Disp: 30 tablet, Rfl: 3  •  atorvastatin (LIPITOR) 40 MG tablet, Take 1 tablet by mouth Daily., Disp: 30 tablet, Rfl: 3  •  lisinopril (PRINIVIL,ZESTRIL) 10 MG tablet, Take 10 mg by mouth Daily., Disp: , Rfl:   •  metoprolol tartrate (LOPRESSOR) 25 MG tablet, Take 1 tablet by mouth 2 (Two) Times a Day., Disp: 60 tablet, Rfl: 3  •  naproxen sodium (ALEVE) 220 MG tablet, Take 220 mg by mouth 2 (Two) Times a Day As Needed., Disp: , Rfl:   •  raNITIdine (ZANTAC) 150 MG tablet, Take 150 mg by mouth Daily., Disp: , Rfl: 1    The following portions of the patient's history were reviewed today and updated as appropriate: allergies, current medications, past family history, " "past medical history, past social history, past surgical history and problem list     Review of Systems   Constitution: Positive for weakness, malaise/fatigue and weight loss. Negative for chills, fever and weight gain.   HENT: Positive for congestion.    Eyes: Negative.    Cardiovascular: Positive for dyspnea on exertion. Negative for chest pain, claudication, cyanosis, irregular heartbeat, leg swelling, near-syncope, orthopnea, palpitations, paroxysmal nocturnal dyspnea and syncope.   Respiratory: Positive for cough, snoring and wheezing. Negative for shortness of breath.    Hematologic/Lymphatic: Does not bruise/bleed easily.   Skin: Negative for poor wound healing.   Musculoskeletal: Negative.    Gastrointestinal: Negative for hematemesis, melena and vomiting.   Genitourinary: Negative.  Negative for hematuria.   Neurological: Negative for dizziness, light-headedness and loss of balance.   Psychiatric/Behavioral: Positive for memory loss. The patient has insomnia and is nervous/anxious.    Allergic/Immunologic: Positive for environmental allergies.       Objective:     Vitals:    06/13/19 1504   BP: 127/76   BP Location: Right arm   Patient Position: Sitting   Pulse: 71   Resp: 16   Temp: 97.3 °F (36.3 °C)   TempSrc: Temporal   SpO2: 100%   Weight: 76.7 kg (169 lb)   Height: 157.5 cm (62\")       Physical Exam   Constitutional: She is oriented to person, place, and time. She appears well-developed and well-nourished. No distress.   HENT:   Head: Normocephalic.   Eyes: Conjunctivae are normal. Pupils are equal, round, and reactive to light.   Neck: Neck supple. No JVD present. No thyromegaly present.   Cardiovascular: Normal rate, regular rhythm, normal heart sounds and intact distal pulses. Exam reveals no gallop and no friction rub.   No murmur heard.  Pulmonary/Chest: Effort normal. No respiratory distress. She has no wheezes. She has no rhonchi. She has no rales. She exhibits no tenderness.   Abdominal: Soft. " Bowel sounds are normal.   Musculoskeletal: Normal range of motion. She exhibits no edema.   Neurological: She is alert and oriented to person, place, and time.   Skin: Skin is warm and dry.   Psychiatric: She has a normal mood and affect. Her behavior is normal. Thought content normal.   Vitals reviewed.      Lab and Diagnostic Review:  Stress echo 5/20/19  · Excellent exercise capacity with normal hemodynamic response to exercise.  · Negative treadmill stress test for anginal chest pain or diagnostic ST segment changes at high workload and target heart rate.  · THR of 149 achieved at 8:40, max hr 151, 85% of MPHR. Expected exercise time 8:20, actual time 8:54. LORI -2.  · Resting echocardiogram showed normal LV systolic function, estimated EF 65%, no significant valve disease.  · Normal stress echo with no significant echocardiographic evidence for myocardial ischemia. Post stress EF 75%.  [employer] Monitor:  Analysis time 7 days and 6 hours.  Normal sinus rhythm with average heart rate of 82, minimum heart rate 52, max rate of 144.  Rare PACs and PVCs.  Triggered events were normal sinus rhythm and PACs.  No arrhythmias noted  Assessment and Plan:   1. Chest pain  Symptoms resolved  Normal stress echo  Per records she did have mildly elevated troponin in [place] but troponins normal at [facility] ED. She does have multiple ASCVD risks. Encouraged to start exercising. Call with any worsening symptoms.     2. Palpitations  Normal extended monitor x 7 days    3. Essential hypertension  Well controlled. Continue metoprolol and lisinopril    4. Hyperlipidemia, unspecified hyperlipidemia type  Continue statin  Repeat CMP and lipids with PCP    5. Tobacco abuse  Cessation encouraged. Plans to start Chantix    6. Generalized weakness  All cardiac testing normal  No worsening on beta blocker, so will continue  Recommend to follow up with PCP    Encouraged patient to call with any worsening symptoms      It has been a pleasure to  participate in the care of this patient.  Patient was instructed to call the [facility] with any questions, concerns, or worsening symptoms.    *Please note that portions of this note were completed with a voice recognition program. Efforts were made to edit the dictations, but occasionally words are mistranscribed.

## 2019-06-13 ENCOUNTER — OFFICE VISIT (OUTPATIENT)
Dept: CARDIOLOGY | Facility: HOSPITAL | Age: 44
End: 2019-06-13

## 2019-06-13 VITALS
DIASTOLIC BLOOD PRESSURE: 76 MMHG | HEART RATE: 71 BPM | RESPIRATION RATE: 16 BRPM | OXYGEN SATURATION: 100 % | WEIGHT: 169 LBS | HEIGHT: 62 IN | BODY MASS INDEX: 31.1 KG/M2 | TEMPERATURE: 97.3 F | SYSTOLIC BLOOD PRESSURE: 127 MMHG

## 2019-06-13 DIAGNOSIS — R00.2 PALPITATIONS: ICD-10-CM

## 2019-06-13 DIAGNOSIS — R53.1 WEAKNESS: ICD-10-CM

## 2019-06-13 DIAGNOSIS — E78.5 HYPERLIPIDEMIA, UNSPECIFIED HYPERLIPIDEMIA TYPE: ICD-10-CM

## 2019-06-13 DIAGNOSIS — Z72.0 TOBACCO ABUSE: ICD-10-CM

## 2019-06-13 DIAGNOSIS — R07.89 OTHER CHEST PAIN: Primary | ICD-10-CM

## 2019-06-13 DIAGNOSIS — I10 ESSENTIAL HYPERTENSION: ICD-10-CM

## 2019-06-13 PROCEDURE — 99214 OFFICE O/P EST MOD 30 MIN: CPT | Performed by: NURSE PRACTITIONER

## 2019-07-01 ENCOUNTER — APPOINTMENT (OUTPATIENT)
Dept: CARDIOLOGY | Facility: HOSPITAL | Age: 44
End: 2019-07-01

## 2019-07-01 ENCOUNTER — HOSPITAL ENCOUNTER (EMERGENCY)
Facility: HOSPITAL | Age: 44
Discharge: HOME | End: 2019-07-01
Attending: EMERGENCY MEDICINE | Admitting: EMERGENCY MEDICINE

## 2019-07-01 VITALS
SYSTOLIC BLOOD PRESSURE: 121 MMHG | OXYGEN SATURATION: 97 % | HEART RATE: 79 BPM | RESPIRATION RATE: 15 BRPM | HEIGHT: 62 IN | DIASTOLIC BLOOD PRESSURE: 77 MMHG | BODY MASS INDEX: 32.57 KG/M2 | TEMPERATURE: 98.8 F | WEIGHT: 177 LBS

## 2019-07-01 DIAGNOSIS — S86.919A MUSCLE STRAIN OF LOWER LEG, UNSPECIFIED LATERALITY, INITIAL ENCOUNTER: Primary | ICD-10-CM

## 2019-07-01 LAB
BUN BLDA-MCNC: 9 MG/DL (ref 8–26)
CA-I BLDA-SCNC: 1.23 MMOL/L (ref 1.2–1.32)
CHLORIDE BLDA-SCNC: 103 MMOL/L (ref 98–109)
CO2 BLDA-SCNC: 24 MMOL/L (ref 24–29)
CREAT BLDA-MCNC: 0.7 MG/DL (ref 0.6–1.3)
GLUCOSE BLDC GLUCOMTR-MCNC: 112 MG/DL (ref 70–130)
HCT VFR BLDA CALC: 46 % (ref 38–51)
HGB BLDA-MCNC: 15.6 G/DL (ref 12–17)
POTASSIUM BLDA-SCNC: 3.9 MMOL/L (ref 3.5–4.9)
SODIUM BLDA-SCNC: 139 MMOL/L (ref 138–146)

## 2019-07-01 PROCEDURE — 85014 HEMATOCRIT: CPT

## 2019-07-01 PROCEDURE — 93971 EXTREMITY STUDY: CPT

## 2019-07-01 PROCEDURE — 80047 BASIC METABLC PNL IONIZED CA: CPT

## 2019-07-01 PROCEDURE — 93970 EXTREMITY STUDY: CPT | Performed by: INTERNAL MEDICINE

## 2019-07-01 PROCEDURE — 93970 EXTREMITY STUDY: CPT

## 2019-07-01 PROCEDURE — 99283 EMERGENCY DEPT VISIT LOW MDM: CPT

## 2019-07-01 NOTE — ED PROVIDER NOTES
"Subjective   43-year-old white female complaining of bilateral calf pain.  Patient states that the left calf has been hurting for the past several days but her right calf started hurting today.  She denies any prolonged walking, prolonged sitting, or injury.  Patient does has a history of fibromyalgia.  Patient denies any chest pain, shortness of breath or other complaints.        History provided by:  Patient  Leg Pain   Location:  Leg  Leg location:  R leg and L leg  Pain details:     Quality:  Dull    Radiates to:  Does not radiate    Onset quality:  Gradual    Timing:  Constant    Progression:  Worsening  Chronicity:  New  Relieved by:  Nothing  Worsened by:  Bearing weight  Ineffective treatments:  None tried  Associated symptoms: no back pain, no fever, no numbness, no swelling and no tingling        Review of Systems   Constitutional: Negative for fever.   Musculoskeletal: Negative for back pain.   All other systems reviewed and are negative.      Past Medical History:   Diagnosis Date   • Dercum's disease    • Fibromyalgia    • GERD (gastroesophageal reflux disease)    • Hyperlipidemia    • Hypertension        Allergies   Allergen Reactions   • Morphine Other (See Comments)     Pt states her blood pressure went \"through the roof, stroke level\"   • Sulfa Antibiotics GI Intolerance     \"makes my stomach hurt\"       Past Surgical History:   Procedure Laterality Date   • CHOLECYSTECTOMY     • DENTAL PROCEDURE     • DILATATION AND CURETTAGE     • HYSTERECTOMY     • OOPHORECTOMY Right    • OTHER SURGICAL HISTORY      has had 19 dercum angiomas removed       Family History   Problem Relation Age of Onset   • Hypothyroidism Mother    • Hypertension Mother    • Diabetes Mother    • Heart attack Father    • Asthma Father    • Diabetes Sister    • Kidney disease Sister    • Heart failure Sister    • Polycystic kidney disease Sister    • Hypertension Sister    • Other Sister         angiomas   • Other Maternal " Grandmother         factor 5    • Heart attack Maternal Grandfather    • Cancer Paternal Grandmother    • Other Paternal Grandmother         ra   • Heart attack Paternal Grandfather        Social History     Socioeconomic History   • Marital status:      Spouse name: Not on file   • Number of children: Not on file   • Years of education: Not on file   • Highest education level: Not on file   Tobacco Use   • Smoking status: Current Every Day Smoker     Packs/day: 1.00     Types: Cigarettes   • Smokeless tobacco: Never Used   Substance and Sexual Activity   • Alcohol use: No     Frequency: Never   • Drug use: No   • Sexual activity: Defer   Social History Narrative    Caffeine: 1 serving daily and trying to quit            Objective   Physical Exam   Constitutional: She appears well-developed and well-nourished.   HENT:   Head: Normocephalic and atraumatic.   Eyes: Conjunctivae are normal.   Cardiovascular: Normal rate, regular rhythm and normal heart sounds.   No murmur heard.  Pulmonary/Chest: Effort normal and breath sounds normal. No respiratory distress.   Musculoskeletal:   Lower extremities: Bilateral calf tenderness on palpation.  There is no erythema, swelling, or other visual abnormalities.  Pulses 4+ at DP and PT bilaterally.  There is normal sensation to the entire extremity.  The remainder the orthopedic exam is normal.   Neurological: She is alert.   Skin: Skin is warm and dry. Capillary refill takes less than 2 seconds. No rash noted.   Psychiatric: She has a normal mood and affect. Her behavior is normal.   Nursing note and vitals reviewed.      Procedures           ED Course  ED Course as of Jul 01 2018 Mon Jul 01, 2019 2011 Duplex negative per Ritu Parmar RN and Charli Ricks RN.  [JI]      ED Course User Index  [JI] Ben Roger PA          Recent Results (from the past 24 hour(s))   Duplex Venous Lower Extremity - Bilateral CAR    Collection Time: 07/01/19  7:16 PM   Result  Value Ref Range    BSA 1.8 m^2     CV ECHO GARCIA - BZI_BMI 32.4 kilograms/m^2     CV ECHO GARCIA - BSA(ROSALBA) 1.9 m^2     CV ECHO GARCIA - BZI_METRIC_WEIGHT 80.3 kg     CV ECHO GARCIA - BZI_METRIC_HEIGHT 157.5 cm    Right Common Femoral Spont Y     Right Common Femoral Phasic Y     Right Common Femoral Augment Y     Right Common Femoral Compress C     Right Saphenofemoral Junction Spont Y     Right Saphenofemoral Junction Phasic Y     Right Saphenofemoral Junction Augment Y     Right Saphenofemoral Junction Compress C     Right Profunda Femoral Compress C     Right Proximal Femoral Compress C     Right Mid Femoral Spont Y     Right Mid Femoral Phasic Y     Right Mid Femoral Augment Y     Right Mid Femoral Compress C     Right Distal Femoral Compress C     Right Popliteal Spont Y     Right Popliteal Phasic Y     Right Popliteal Augment Y     Right Popliteal Compress C     Right Posterior Tibial Compress C     Right Peroneal Compress C     Right GastronemiusSoleal Compress C     Right Greater Saph AK Compress C     Right Greater Saph BK Compress C     Right Lesser Saph Compress C     Left Common Femoral Spont Y     Left Common Femoral Phasic Y     Left Common Femoral Augment Y     Left Common Femoral Compress C     Left Saphenofemoral Junction Spont Y     Left Saphenofemoral Junction Phasic Y     Left Saphenofemoral Junction Augment Y     Left Saphenofemoral Junction Compress C     Left Profunda Femoral Spont Y     Left Profunda Femoral Phasic Y     Left Profunda Femoral Augment Y     Left Profunda Femoral Compress C     Left Proximal Femoral Compress C     Left Mid Femoral Spont Y     Left Mid Femoral Phasic Y     Left Mid Femoral Augment Y     Left Mid Femoral Compress C     Left Distal Femoral Compress C     Left Popliteal Spont Y     Left Popliteal Phasic Y     Left Popliteal Augment Y     Left Popliteal Compress C     Left Posterior Tibial Compress C     Left Peroneal Compress C     Left GastronemiusSoleal  "Compress C     Left Greater Saph AK Compress C     Left Greater Saph BK Compress C     Left Lesser Saph Compress C    POC CHEM 8    Collection Time: 07/01/19  8:12 PM   Result Value Ref Range    Glucose 112 70 - 130 mg/dL    BUN 9 8 - 26 mg/dL    Creatinine 0.70 0.60 - 1.30 mg/dL    Sodium 139 138 - 146 mmol/L    Potassium 3.9 3.5 - 4.9 mmol/L    Chloride 103 98 - 109 mmol/L    Total CO2 24 24 - 29 mmol/L    Hemoglobin 15.6 12.0 - 17.0 g/dL    Hematocrit 46 38 - 51 %    Ionized Calcium 1.23 1.20 - 1.32 mmol/L     Note: In addition to lab results from this visit, the labs listed above may include labs taken at another facility or during a different encounter within the last 24 hours. Please correlate lab times with ED admission and discharge times for further clarification of the services performed during this visit.    No orders to display     Vitals:    07/01/19 1800   BP: 134/75   BP Location: Left arm   Patient Position: Sitting   Pulse: 76   Resp: 20   Temp: 98.6 °F (37 °C)   TempSrc: Oral   SpO2: 98%   Weight: 80.3 kg (177 lb)   Height: 157.5 cm (62\")     Medications - No data to display  ECG/EMG Results (last 24 hours)     ** No results found for the last 24 hours. **        No orders to display             MDM      Final diagnoses:   Muscle strain of lower leg, unspecified laterality, initial encounter            Ben Roger PA  07/01/19 2019    "

## 2019-07-02 LAB
BH CV ECHO MEAS - BSA(HAYCOCK): 1.9 M^2
BH CV ECHO MEAS - BSA: 1.8 M^2
BH CV ECHO MEAS - BZI_BMI: 32.4 KILOGRAMS/M^2
BH CV ECHO MEAS - BZI_METRIC_HEIGHT: 157.5 CM
BH CV ECHO MEAS - BZI_METRIC_WEIGHT: 80.3 KG
BH CV LOWER VASCULAR LEFT COMMON FEMORAL AUGMENT: NORMAL
BH CV LOWER VASCULAR LEFT COMMON FEMORAL COMPRESS: NORMAL
BH CV LOWER VASCULAR LEFT COMMON FEMORAL PHASIC: NORMAL
BH CV LOWER VASCULAR LEFT COMMON FEMORAL SPONT: NORMAL
BH CV LOWER VASCULAR LEFT DISTAL FEMORAL COMPRESS: NORMAL
BH CV LOWER VASCULAR LEFT GASTRONEMIUS COMPRESS: NORMAL
BH CV LOWER VASCULAR LEFT GREATER SAPH AK COMPRESS: NORMAL
BH CV LOWER VASCULAR LEFT GREATER SAPH BK COMPRESS: NORMAL
BH CV LOWER VASCULAR LEFT LESSER SAPH COMPRESS: NORMAL
BH CV LOWER VASCULAR LEFT MID FEMORAL AUGMENT: NORMAL
BH CV LOWER VASCULAR LEFT MID FEMORAL COMPRESS: NORMAL
BH CV LOWER VASCULAR LEFT MID FEMORAL PHASIC: NORMAL
BH CV LOWER VASCULAR LEFT MID FEMORAL SPONT: NORMAL
BH CV LOWER VASCULAR LEFT PERONEAL COMPRESS: NORMAL
BH CV LOWER VASCULAR LEFT POPLITEAL AUGMENT: NORMAL
BH CV LOWER VASCULAR LEFT POPLITEAL COMPRESS: NORMAL
BH CV LOWER VASCULAR LEFT POPLITEAL PHASIC: NORMAL
BH CV LOWER VASCULAR LEFT POPLITEAL SPONT: NORMAL
BH CV LOWER VASCULAR LEFT POSTERIOR TIBIAL COMPRESS: NORMAL
BH CV LOWER VASCULAR LEFT PROFUNDA FEMORAL AUGMENT: NORMAL
BH CV LOWER VASCULAR LEFT PROFUNDA FEMORAL COMPRESS: NORMAL
BH CV LOWER VASCULAR LEFT PROFUNDA FEMORAL PHASIC: NORMAL
BH CV LOWER VASCULAR LEFT PROFUNDA FEMORAL SPONT: NORMAL
BH CV LOWER VASCULAR LEFT PROXIMAL FEMORAL COMPRESS: NORMAL
BH CV LOWER VASCULAR LEFT SAPHENOFEMORAL JUNCTION AUGMENT: NORMAL
BH CV LOWER VASCULAR LEFT SAPHENOFEMORAL JUNCTION COMPRESS: NORMAL
BH CV LOWER VASCULAR LEFT SAPHENOFEMORAL JUNCTION PHASIC: NORMAL
BH CV LOWER VASCULAR LEFT SAPHENOFEMORAL JUNCTION SPONT: NORMAL
BH CV LOWER VASCULAR RIGHT COMMON FEMORAL AUGMENT: NORMAL
BH CV LOWER VASCULAR RIGHT COMMON FEMORAL COMPRESS: NORMAL
BH CV LOWER VASCULAR RIGHT COMMON FEMORAL PHASIC: NORMAL
BH CV LOWER VASCULAR RIGHT COMMON FEMORAL SPONT: NORMAL
BH CV LOWER VASCULAR RIGHT DISTAL FEMORAL COMPRESS: NORMAL
BH CV LOWER VASCULAR RIGHT GASTRONEMIUS COMPRESS: NORMAL
BH CV LOWER VASCULAR RIGHT GREATER SAPH AK COMPRESS: NORMAL
BH CV LOWER VASCULAR RIGHT GREATER SAPH BK COMPRESS: NORMAL
BH CV LOWER VASCULAR RIGHT LESSER SAPH COMPRESS: NORMAL
BH CV LOWER VASCULAR RIGHT MID FEMORAL AUGMENT: NORMAL
BH CV LOWER VASCULAR RIGHT MID FEMORAL COMPRESS: NORMAL
BH CV LOWER VASCULAR RIGHT MID FEMORAL PHASIC: NORMAL
BH CV LOWER VASCULAR RIGHT MID FEMORAL SPONT: NORMAL
BH CV LOWER VASCULAR RIGHT PERONEAL COMPRESS: NORMAL
BH CV LOWER VASCULAR RIGHT POPLITEAL AUGMENT: NORMAL
BH CV LOWER VASCULAR RIGHT POPLITEAL COMPRESS: NORMAL
BH CV LOWER VASCULAR RIGHT POPLITEAL PHASIC: NORMAL
BH CV LOWER VASCULAR RIGHT POPLITEAL SPONT: NORMAL
BH CV LOWER VASCULAR RIGHT POSTERIOR TIBIAL COMPRESS: NORMAL
BH CV LOWER VASCULAR RIGHT PROFUNDA FEMORAL COMPRESS: NORMAL
BH CV LOWER VASCULAR RIGHT PROXIMAL FEMORAL COMPRESS: NORMAL
BH CV LOWER VASCULAR RIGHT SAPHENOFEMORAL JUNCTION AUGMENT: NORMAL
BH CV LOWER VASCULAR RIGHT SAPHENOFEMORAL JUNCTION COMPRESS: NORMAL
BH CV LOWER VASCULAR RIGHT SAPHENOFEMORAL JUNCTION PHASIC: NORMAL
BH CV LOWER VASCULAR RIGHT SAPHENOFEMORAL JUNCTION SPONT: NORMAL

## 2019-07-02 NOTE — DISCHARGE INSTRUCTIONS
Continue your Aleve.  Follow-up with your doctor if no improvement in 2 to 3 days.  Return if symptoms worsen.

## 2022-02-07 ENCOUNTER — TRANSCRIBE ORDERS (OUTPATIENT)
Dept: ADMINISTRATIVE | Facility: HOSPITAL | Age: 47
End: 2022-02-07

## 2022-03-25 ENCOUNTER — TRANSCRIBE ORDERS (OUTPATIENT)
Dept: ADMINISTRATIVE | Facility: HOSPITAL | Age: 47
End: 2022-03-25

## 2022-03-25 DIAGNOSIS — Z11.59 ENCOUNTER FOR SCREENING FOR VIRAL DISEASE: Primary | ICD-10-CM

## 2022-04-04 ENCOUNTER — LAB (OUTPATIENT)
Dept: PREADMISSION TESTING | Facility: HOSPITAL | Age: 47
End: 2022-04-04

## 2022-04-04 DIAGNOSIS — Z11.59 ENCOUNTER FOR SCREENING FOR VIRAL DISEASE: ICD-10-CM

## 2022-04-04 LAB — SARS-COV-2 RNA PNL SPEC NAA+PROBE: NOT DETECTED

## 2022-04-04 PROCEDURE — U0004 COV-19 TEST NON-CDC HGH THRU: HCPCS

## 2022-04-04 PROCEDURE — C9803 HOPD COVID-19 SPEC COLLECT: HCPCS

## 2022-04-06 ENCOUNTER — LAB REQUISITION (OUTPATIENT)
Dept: LAB | Facility: HOSPITAL | Age: 47
End: 2022-04-06

## 2022-04-06 DIAGNOSIS — R22.2 LOCALIZED SWELLING, MASS AND LUMP, TRUNK: ICD-10-CM

## 2022-04-06 PROCEDURE — 88304 TISSUE EXAM BY PATHOLOGIST: CPT | Performed by: SURGERY

## 2022-04-08 LAB
CYTO UR: NORMAL
LAB AP CASE REPORT: NORMAL
LAB AP DIAGNOSIS COMMENT: NORMAL
PATH REPORT.FINAL DX SPEC: NORMAL
PATH REPORT.GROSS SPEC: NORMAL